# Patient Record
(demographics unavailable — no encounter records)

---

## 2024-12-06 NOTE — PHYSICAL EXAM
[General Appearance - Well Developed] : well developed [Normal Appearance] : normal appearance [General Appearance - In No Acute Distress] : no acute distress [] : no respiratory distress [Exaggerated Use Of Accessory Muscles For Inspiration] : no accessory muscle use [Testes Tenderness] : no tenderness of the testes [Testes Mass (___cm)] : there were no testicular masses [de-identified] : normal penile exam, no palpable mass or plaque

## 2024-12-06 NOTE — ASSESSMENT
[FreeTextEntry1] : Patient is a 60 yo M who presents for ED. Penile mass now resolved on exam Excellent response to cialis, renewed F/u 6 mos - will check PSA then

## 2024-12-06 NOTE — HISTORY OF PRESENT ILLNESS
[FreeTextEntry1] : 60 yo M hx of ED on viagra, who presents for f/u of penile mass and review of ultrasound.   HPI: He reports for 1-2 yrs noticing worsening erections. Has difficulty obtaining and maintaining his erections. He thinks his ED may have started prior to an accident that occurred 1 yr ago when he fell down a manhole. Normal sexual desire. Energy level is fine. Occasional spontaneous AM erections. He will often lose his erections during sexual intercourse after 1-2 minutes. 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 On citalopram. T level wnl in 2018. Recent PSA 4/2021 1.32.  6/17/2024 Patient has been lost for follow up since 2022. He presents for follow up ED. Reports he takes viagra 100 mg (1/2 tab) on demand with good response, he is able to obtain erection 7-8/10, sufficient for intercourse. Unclear if he has tried tadalafil - pt is unsure. He has been physical therapy for joint, back, knee three time per weeks. No voiding complaints. Nocturia x 0. Denied urinary frequency, urgency. feels emptying well. PSA 1.32 4/2021. No recent PSAs.  8/9/2024 Patient presents today for penile mass. He reports his female partner noticed penile lump on the shaft two weeks ago and advised him for evaluation. He is concerned malignancy given history of [facility] exposure. He states lump is more prominent during erection. He denies any penile curvature. He denies any associated symptoms, no pain, no skin lesion, no erythema, blister or discharges. Lump remains stable, he has not noticed any changes, no increased size in the past weeks. No voiding complaints. He also notices dark brown color skin tag on the right scrotum, no pain or bleeding. He denies history of STIs. He endorses vasectomy over 20 years ago. ED: continuing on viagra for ED with good response, no adverse side effects.   8/30/24: Patient seen today to examine penile mass and evaluate penile ultrasound.  ED: Patient currently with good response to Viagra for ED. Has not tried cialis yet.  12/6/24 No penile pain or curvature.  Has not noticed penile mass. Excellent response to cialis -10/10 erections.

## 2024-12-06 NOTE — PHYSICAL EXAM
[General Appearance - Well Developed] : well developed [Normal Appearance] : normal appearance [General Appearance - In No Acute Distress] : no acute distress [] : no respiratory distress [Exaggerated Use Of Accessory Muscles For Inspiration] : no accessory muscle use [Testes Tenderness] : no tenderness of the testes [Testes Mass (___cm)] : there were no testicular masses [de-identified] : normal penile exam, no palpable mass or plaque

## 2024-12-06 NOTE — HISTORY OF PRESENT ILLNESS
[FreeTextEntry1] : 58 yo M hx of ED on viagra, who presents for f/u of penile mass and review of ultrasound.   HPI: He reports for 1-2 yrs noticing worsening erections. Has difficulty obtaining and maintaining his erections. He thinks his ED may have started prior to an accident that occurred 1 yr ago when he fell down a manhole. Normal sexual desire. Energy level is fine. Occasional spontaneous AM erections. He will often lose his erections during sexual intercourse after 1-2 minutes. 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 On citalopram. T level wnl in 2018. Recent PSA 4/2021 1.32.  6/17/2024 Patient has been lost for follow up since 2022. He presents for follow up ED. Reports he takes viagra 100 mg (1/2 tab) on demand with good response, he is able to obtain erection 7-8/10, sufficient for intercourse. Unclear if he has tried tadalafil - pt is unsure. He has been physical therapy for joint, back, knee three time per weeks. No voiding complaints. Nocturia x 0. Denied urinary frequency, urgency. feels emptying well. PSA 1.32 4/2021. No recent PSAs.  8/9/2024 Patient presents today for penile mass. He reports his female partner noticed penile lump on the shaft two weeks ago and advised him for evaluation. He is concerned malignancy given history of [facility] exposure. He states lump is more prominent during erection. He denies any penile curvature. He denies any associated symptoms, no pain, no skin lesion, no erythema, blister or discharges. Lump remains stable, he has not noticed any changes, no increased size in the past weeks. No voiding complaints. He also notices dark brown color skin tag on the right scrotum, no pain or bleeding. He denies history of STIs. He endorses vasectomy over 20 years ago. ED: continuing on viagra for ED with good response, no adverse side effects.   8/30/24: Patient seen today to examine penile mass and evaluate penile ultrasound.  ED: Patient currently with good response to Viagra for ED. Has not tried cialis yet.  12/6/24 No penile pain or curvature.  Has not noticed penile mass. Excellent response to cialis -10/10 erections.

## 2024-12-06 NOTE — HISTORY OF PRESENT ILLNESS
[FreeTextEntry1] : 58 yo M hx of ED on viagra, who presents for f/u of penile mass and review of ultrasound.   HPI: He reports for 1-2 yrs noticing worsening erections. Has difficulty obtaining and maintaining his erections. He thinks his ED may have started prior to an accident that occurred 1 yr ago when he fell down a manhole. Normal sexual desire. Energy level is fine. Occasional spontaneous AM erections. He will often lose his erections during sexual intercourse after 1-2 minutes. 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 On citalopram. T level wnl in 2018. Recent PSA 4/2021 1.32.  6/17/2024 Patient has been lost for follow up since 2022. He presents for follow up ED. Reports he takes viagra 100 mg (1/2 tab) on demand with good response, he is able to obtain erection 7-8/10, sufficient for intercourse. Unclear if he has tried tadalafil - pt is unsure. He has been physical therapy for joint, back, knee three time per weeks. No voiding complaints. Nocturia x 0. Denied urinary frequency, urgency. feels emptying well. PSA 1.32 4/2021. No recent PSAs.  8/9/2024 Patient presents today for penile mass. He reports his female partner noticed penile lump on the shaft two weeks ago and advised him for evaluation. He is concerned malignancy given history of [facility] exposure. He states lump is more prominent during erection. He denies any penile curvature. He denies any associated symptoms, no pain, no skin lesion, no erythema, blister or discharges. Lump remains stable, he has not noticed any changes, no increased size in the past weeks. No voiding complaints. He also notices dark brown color skin tag on the right scrotum, no pain or bleeding. He denies history of STIs. He endorses vasectomy over 20 years ago. ED: continuing on viagra for ED with good response, no adverse side effects.   8/30/24: Patient seen today to examine penile mass and evaluate penile ultrasound.  ED: Patient currently with good response to Viagra for ED. Has not tried cialis yet.  12/6/24 No penile pain or curvature.  Has not noticed penile mass. Excellent response to cialis -10/10 erections.

## 2024-12-06 NOTE — PHYSICAL EXAM
[General Appearance - Well Developed] : well developed [Normal Appearance] : normal appearance [General Appearance - In No Acute Distress] : no acute distress [] : no respiratory distress [Exaggerated Use Of Accessory Muscles For Inspiration] : no accessory muscle use [Testes Tenderness] : no tenderness of the testes [Testes Mass (___cm)] : there were no testicular masses [de-identified] : normal penile exam, no palpable mass or plaque

## 2024-12-06 NOTE — ASSESSMENT
[FreeTextEntry1] : Patient is a 58 yo M who presents for ED. Penile mass now resolved on exam Excellent response to cialis, renewed F/u 6 mos - will check PSA then

## 2025-06-20 NOTE — PHYSICAL EXAM
[General Appearance - Well Developed] : well developed [Normal Appearance] : normal appearance [General Appearance - In No Acute Distress] : no acute distress [] : no respiratory distress [Exaggerated Use Of Accessory Muscles For Inspiration] : no accessory muscle use [de-identified] : normal penis exam - he points to area c/w dorsal neurovascular bundle

## 2025-06-20 NOTE — HISTORY OF PRESENT ILLNESS
[FreeTextEntry1] : 58 yo M hx of ED on viagra, who presents for f/u of penile mass and review of ultrasound.  HPI: He reports for 1-2 yrs noticing worsening erections. Has difficulty obtaining and maintaining his erections. He thinks his ED may have started prior to an accident that occurred 1 yr ago when he fell down a manhole. Normal sexual desire. Energy level is fine. Occasional spontaneous AM erections. He will often lose his erections during sexual intercourse after 1-2 minutes. 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 T level wnl in 2018. Recent PSA 4/2021 1.32.  6/17/2024 Patient has been lost for follow up since 2022. He presents for follow up ED. Reports he takes viagra 100 mg (1/2 tab) on demand with good response, he is able to obtain erection 7-8/10, sufficient for intercourse. Unclear if he has tried tadalafil - pt is unsure. He has been physical therapy for joint, back, knee three time per weeks. No voiding complaints. Nocturia x 0. Denied urinary frequency, urgency. feels emptying well. PSA 1.32 4/2021. No recent PSAs.  8/9/2024 Patient presents today for penile mass. He reports his female partner noticed penile lump on the shaft two weeks ago and advised him for evaluation. He is concerned malignancy given history of [facility] exposure. He states lump is more prominent during erection. He denies any penile curvature. He denies any associated symptoms, no pain, no skin lesion, no erythema, blister or discharges. Lump remains stable, he has not noticed any changes, no increased size in the past weeks. No voiding complaints. He also notices dark brown color skin tag on the right scrotum, no pain or bleeding. He denies history of STIs. He endorses vasectomy over 20 years ago. ED: continuing on viagra for ED with good response, no adverse side effects.  8/30/24: Patient seen today to examine penile mass and evaluated by penile ultrasound, which showed superficial thrombosed vein. ED: Patient currently with good response to Viagra for ED. Has not tried cialis yet.  12/6/24 No penile pain or curvature. Has not noticed penile mass. Excellent response to cialis -10/10 erections.  6/20/25 Here for f/u.  Reports excellent response to cialis. He notices the small penile mass intermittently.  No LUTS.  Otherwise doing well.  Trying to lose weight.

## 2025-06-20 NOTE — ASSESSMENT
[FreeTextEntry1] : Patient is a 61 yo M who presents for ED. Interested in PSA screening - PSA ordered today Prior penile mass not appreciated on exam today and previously ULT noted thrombosed/prominent superficial dorsal vein Good response to cialis 20 mg - renewed today  F/u 1 yr